# Patient Record
Sex: FEMALE | Race: BLACK OR AFRICAN AMERICAN | NOT HISPANIC OR LATINO | Employment: FULL TIME | URBAN - METROPOLITAN AREA
[De-identification: names, ages, dates, MRNs, and addresses within clinical notes are randomized per-mention and may not be internally consistent; named-entity substitution may affect disease eponyms.]

---

## 2024-02-16 ENCOUNTER — HOSPITAL ENCOUNTER (EMERGENCY)
Facility: HOSPITAL | Age: 24
Discharge: HOME | End: 2024-02-16
Attending: EMERGENCY MEDICINE
Payer: COMMERCIAL

## 2024-02-16 VITALS
SYSTOLIC BLOOD PRESSURE: 144 MMHG | DIASTOLIC BLOOD PRESSURE: 80 MMHG | HEART RATE: 113 BPM | OXYGEN SATURATION: 100 % | HEIGHT: 71 IN | BODY MASS INDEX: 30.1 KG/M2 | WEIGHT: 215 LBS | RESPIRATION RATE: 20 BRPM | TEMPERATURE: 98 F

## 2024-02-16 DIAGNOSIS — O03.9 MISCARRIAGE: Primary | ICD-10-CM

## 2024-02-16 LAB
ABORH RETYPE: NORMAL
ALBUMIN SERPL-MCNC: 3.7 G/DL (ref 3.5–5)
ALBUMIN/GLOB SERPL: 1.5 RATIO (ref 1.1–2)
ALP SERPL-CCNC: 45 UNIT/L (ref 40–150)
ALT SERPL-CCNC: 10 UNIT/L (ref 0–55)
AST SERPL-CCNC: 13 UNIT/L (ref 5–34)
B-HCG FREE SERPL-ACNC: 1924.35 MIU/ML
B-HCG SERPL QL: POSITIVE
BASOPHILS # BLD AUTO: 0.03 X10(3)/MCL
BASOPHILS NFR BLD AUTO: 0.3 %
BILIRUB SERPL-MCNC: 0.5 MG/DL
BUN SERPL-MCNC: 12 MG/DL (ref 7–18.7)
CALCIUM SERPL-MCNC: 8.9 MG/DL (ref 8.4–10.2)
CHLORIDE SERPL-SCNC: 108 MMOL/L (ref 98–107)
CO2 SERPL-SCNC: 22 MMOL/L (ref 22–29)
CREAT SERPL-MCNC: 0.7 MG/DL (ref 0.55–1.02)
EOSINOPHIL # BLD AUTO: 0.08 X10(3)/MCL (ref 0–0.9)
EOSINOPHIL NFR BLD AUTO: 0.7 %
ERYTHROCYTE [DISTWIDTH] IN BLOOD BY AUTOMATED COUNT: 13.1 % (ref 11.5–17)
GFR SERPLBLD CREATININE-BSD FMLA CKD-EPI: >60 MLS/MIN/1.73/M2
GLOBULIN SER-MCNC: 2.4 GM/DL (ref 2.4–3.5)
GLUCOSE SERPL-MCNC: 127 MG/DL (ref 74–100)
GROUP & RH: NORMAL
HCT VFR BLD AUTO: 29.4 % (ref 37–47)
HGB BLD-MCNC: 9.5 G/DL (ref 12–16)
IMM GRANULOCYTES # BLD AUTO: 0.06 X10(3)/MCL (ref 0–0.04)
IMM GRANULOCYTES NFR BLD AUTO: 0.5 %
INDIRECT COOMBS: NORMAL
LYMPHOCYTES # BLD AUTO: 1.3 X10(3)/MCL (ref 0.6–4.6)
LYMPHOCYTES NFR BLD AUTO: 11.3 %
MCH RBC QN AUTO: 29.1 PG (ref 27–31)
MCHC RBC AUTO-ENTMCNC: 32.3 G/DL (ref 33–36)
MCV RBC AUTO: 90.2 FL (ref 80–94)
MONOCYTES # BLD AUTO: 0.36 X10(3)/MCL (ref 0.1–1.3)
MONOCYTES NFR BLD AUTO: 3.1 %
NEUTROPHILS # BLD AUTO: 9.66 X10(3)/MCL (ref 2.1–9.2)
NEUTROPHILS NFR BLD AUTO: 84.1 %
NRBC BLD AUTO-RTO: 0 %
PLATELET # BLD AUTO: 230 X10(3)/MCL (ref 130–400)
PMV BLD AUTO: 11.6 FL (ref 7.4–10.4)
POTASSIUM SERPL-SCNC: 3.9 MMOL/L (ref 3.5–5.1)
PROT SERPL-MCNC: 6.1 GM/DL (ref 6.4–8.3)
RBC # BLD AUTO: 3.26 X10(6)/MCL (ref 4.2–5.4)
SODIUM SERPL-SCNC: 136 MMOL/L (ref 136–145)
SPECIMEN OUTDATE: NORMAL
WBC # SPEC AUTO: 11.49 X10(3)/MCL (ref 4.5–11.5)

## 2024-02-16 PROCEDURE — 84702 CHORIONIC GONADOTROPIN TEST: CPT | Performed by: NURSE PRACTITIONER

## 2024-02-16 PROCEDURE — 80053 COMPREHEN METABOLIC PANEL: CPT | Performed by: NURSE PRACTITIONER

## 2024-02-16 PROCEDURE — 25000003 PHARM REV CODE 250: Performed by: NURSE PRACTITIONER

## 2024-02-16 PROCEDURE — 99284 EMERGENCY DEPT VISIT MOD MDM: CPT | Mod: 25

## 2024-02-16 PROCEDURE — 85025 COMPLETE CBC W/AUTO DIFF WBC: CPT | Performed by: NURSE PRACTITIONER

## 2024-02-16 PROCEDURE — 86850 RBC ANTIBODY SCREEN: CPT | Performed by: NURSE PRACTITIONER

## 2024-02-16 PROCEDURE — 63600175 PHARM REV CODE 636 W HCPCS: Performed by: NURSE PRACTITIONER

## 2024-02-16 PROCEDURE — 96374 THER/PROPH/DIAG INJ IV PUSH: CPT

## 2024-02-16 PROCEDURE — 84703 CHORIONIC GONADOTROPIN ASSAY: CPT | Performed by: NURSE PRACTITIONER

## 2024-02-16 RX ORDER — ONDANSETRON HYDROCHLORIDE 2 MG/ML
4 INJECTION, SOLUTION INTRAVENOUS
Status: COMPLETED | OUTPATIENT
Start: 2024-02-16 | End: 2024-02-16

## 2024-02-16 RX ORDER — ACETAMINOPHEN 500 MG
1000 TABLET ORAL
Status: COMPLETED | OUTPATIENT
Start: 2024-02-16 | End: 2024-02-16

## 2024-02-16 RX ORDER — SODIUM CHLORIDE 9 MG/ML
1000 INJECTION, SOLUTION INTRAVENOUS
Status: COMPLETED | OUTPATIENT
Start: 2024-02-16 | End: 2024-02-16

## 2024-02-16 RX ADMIN — ONDANSETRON 4 MG: 2 INJECTION INTRAMUSCULAR; INTRAVENOUS at 02:02

## 2024-02-16 RX ADMIN — SODIUM CHLORIDE 1000 ML: 9 INJECTION, SOLUTION INTRAVENOUS at 02:02

## 2024-02-16 RX ADMIN — ACETAMINOPHEN 1000 MG: 500 TABLET ORAL at 01:02

## 2024-02-16 NOTE — Clinical Note
"Jc Li" Imani was seen and treated in our emergency department on 2/16/2024.  She may return to work on 02/18/2024.       If you have any questions or concerns, please don't hesitate to call.      LEXY Givens RN    "

## 2024-02-16 NOTE — FIRST PROVIDER EVALUATION
"Medical screening examination initiated.  I have conducted a focused provider triage encounter, findings are as follows:  Chief Complaint   Patient presents with    Vaginal Bleeding     Pt. Reports lower abdominal cramping and vaginal bleeding since yesterday morning. LMP 24, approx 12 weeks gestation, . OB Dr. Jacobo.          Brief history of present illness:  22 y/o female presents with being approximately 12 weeks gestation and having low abdominal cramping and vaginal bleeding since yesterday. .     Vitals:    24 0922   BP: 112/68   Pulse: (!) 113   Resp: 20   Temp: 97.7 °F (36.5 °C)   TempSrc: Oral   SpO2: 100%   Weight: 97.5 kg (215 lb)   Height: 5' 11" (1.803 m)       Pertinent physical exam:  alert, in wheel chair, not labored     Brief workup plan:  labs, urine    Preliminary workup initiated; this workup will be continued and followed by the physician or advanced practice provider that is assigned to the patient when roomed.  "

## 2024-02-18 NOTE — ED PROVIDER NOTES
Encounter Date: 2024       History     Chief Complaint   Patient presents with    Vaginal Bleeding     Pt. Reports lower abdominal cramping and vaginal bleeding since yesterday morning. LMP 24, approx 12 weeks gestation, . OB Dr. Jacobo.      See MDM    The history is provided by the patient. No  was used.     Review of patient's allergies indicates:  No Known Allergies  No past medical history on file.  No past surgical history on file.  No family history on file.     Review of Systems   Gastrointestinal:  Positive for abdominal pain.   Genitourinary:  Positive for vaginal bleeding.   All other systems reviewed and are negative.      Physical Exam     Initial Vitals [24 0922]   BP Pulse Resp Temp SpO2   112/68 (!) 113 20 97.7 °F (36.5 °C) 100 %      MAP       --         Physical Exam    Nursing note and vitals reviewed.  Constitutional: She appears well-developed and well-nourished.   Cardiovascular:  Normal rate, regular rhythm and normal heart sounds.           Pulmonary/Chest: Breath sounds normal. No respiratory distress.   Abdominal: Abdomen is soft. Bowel sounds are normal. She exhibits no distension. There is abdominal tenderness (suprapubic tenderness).   Genitourinary:    Vaginal bleeding present.   There is bleeding in the vagina.    Genitourinary Comments: Few clots noted in vaginal canal that I removed.  There is still products of conception coming out of the cervix that despite bearing down and me using a ring forceps unable to get out currently.  Bleeding is controlled       Neurological: She is alert and oriented to person, place, and time.   Skin: Skin is warm and dry.   Psychiatric: She has a normal mood and affect.         ED Course   Procedures  Labs Reviewed   CBC WITH DIFFERENTIAL - Abnormal; Notable for the following components:       Result Value    RBC 3.26 (*)     Hgb 9.5 (*)     Hct 29.4 (*)     MCHC 32.3 (*)     MPV 11.6 (*)     Neut # 9.66 (*)      IG# 0.06 (*)     All other components within normal limits   COMPREHENSIVE METABOLIC PANEL - Abnormal; Notable for the following components:    Chloride 108 (*)     Glucose Level 127 (*)     Protein Total 6.1 (*)     All other components within normal limits   HCG, QUANTITATIVE - Abnormal; Notable for the following components:    Beta Human Chorionic Gonadotropin Quantitative 1,924.35 (*)     All other components within normal limits   HCG, SERUM, QUALITATIVE - Abnormal; Notable for the following components:    Beta Human Chorionic Gonadotropin Qualitative Positive (*)     All other components within normal limits   TYPE & SCREEN   ABORH RETYPE          Imaging Results              US OB <14 Wks, TransAbd, Single Gestation (Final result)  Result time 02/16/24 13:07:58   Procedure changed from US OB <14 Wks TransAbd & TransVag, Single Gestation (XPD)     Final result by Jennifer Ivory MD (02/16/24 13:07:58)                   Impression:      Distension of the endocervical canal with irregular fluid collection.  This may be related to blood products or products of conception/inevitable miscarriage.    Recommend continued follow up with serial quantitative beta HCG and/or repeat ultrasound.      Electronically signed by: Jennifer Ivory  Date:    02/16/2024  Time:    13:07               Narrative:    EXAMINATION:  US OB <14 WEEKS TRANSABDOM, SINGLE GESTATION    CLINICAL HISTORY:  vaginal bleeding;    TECHNIQUE:  Transabdominal sonography of the pelvis was performed.    COMPARISON:  No relevant prior available for comparison.    FINDINGS:  UTERUS: Uterus measures 12.5 x 6.5 x 5.3 cm.  Endometrium measures 7 mm.  There is distension of the cervix with irregular fluid collection measuring 4 x 2.3 x 1.9 cm.  No appreciable yolk sac or embryo.    RIGHT ADNEXA: Normal    LEFT ADNEXA: Normal.    PERITONEUM:No significant free intraperitoneal fluid.                                       Medications   acetaminophen  tablet 1,000 mg (1,000 mg Oral Given 24 1330)   0.9%  NaCl infusion (1,000 mLs Intravenous New Bag 24 9649)   ondansetron injection 4 mg (4 mg Intravenous Given by Other 24 1632)     Medical Decision Making  23-year-old female presents with being approximately 12 weeks' gestation  being followed by Dr. Barbour.  She states that she started with vaginal bleeding and abdominal pain yesterday that has progressively worsened today and states now that she is having a lot of abdominal pain and heavier bleeding with blood clots.  She states that OBGYN office stated that they were concerned it was a blighted ovum and they were doing serial ultrasounds which were not showing much progression and never had a fetal pole until last week but there was no heartbeat.    Lab work shows a beta hCG of 1900 with ches not consistent with a pregnancy of 12 weeks.  Ultrasound shows no IUP and appears to have concern for blood products and or products of conception in the cervical canal.  This is consistent with my pelvic exam.  Patient is Rh positive.  Her cramping has slowed down significantly her bleeding is controlled.  Unable to get all of the products of conception out as some of it is still in the cervix.  Discussed with the patient that this is a miscarriage.  She does have follow-up with OBGYN soon    Amount and/or Complexity of Data Reviewed  Labs: ordered. Decision-making details documented in ED Course.  Radiology: ordered. Decision-making details documented in ED Course.    Risk  OTC drugs.  Prescription drug management.      Additional MDM:   Differential Diagnosis:   Other: The following diagnoses were also considered and will be evaluated: Miscarriage, Threatened miscarriage and Urinary tract infection.                                   Clinical Impression:  Final diagnoses:  [O03.9] Miscarriage (Primary)          ED Disposition Condition    Discharge Stable          ED Prescriptions    None        Follow-up Information       Follow up With Specialties Details Why Contact Info    Elena Hidalgo MD Family Medicine   [address]  [address]  [facility] [address]  [phone]      obshiraz - dr romo  Call  monday              Chata Hollins, [facility]  02/17/24 [id number]     Never